# Patient Record
(demographics unavailable — no encounter records)

---

## 2025-01-15 NOTE — PHYSICAL EXAM
[Midline] : trachea located in midline position [Normal] : no rashes [de-identified] : Right HB 3/6, Left HB 1/6, other CN intact

## 2025-01-15 NOTE — HISTORY OF PRESENT ILLNESS
[de-identified] : Nicol Franklin is a 65 yo female with hx ovarian cancer who was referred by Dr. Jefferson for evaluation of gradual hearing loss. She notes gradual hearing loss over years in both ears, left worse than right. She denies otalgia but notes ear itching. She denies tinnitus or vertigo. She denies otalgia or otorrhea. She denies history of recurrent ear infections. She denies weakness or numbness of extremities. She had right facial weakness since childhood and she was told she had Bell's palsy. She denies recent fevers or chills. She denies family history of early onset hearing loss or significant noise exposure. She notes history of chemotherapy. She denies head trauma. [FreeTextEntry1] : 1/15/25 - Ms. Franklin presents for follow-up. She denies change in hearing. She denies tinnitus, vertigo, otalgia, otorrhea, or recent ear infections. She has known stable right facial weakness. Denies recent fevers.

## 2025-01-15 NOTE — ASSESSMENT
[FreeTextEntry1] : Nicol Franklin presents for follow-up of hearing. Otoscopic exam is normal. 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 Discussed amplification but she defers. She has known right facial weakness present since childhood due to Bell's palsy per her report, and this is stable on exam.  - f/u in 1 year with audio.